# Patient Record
(demographics unavailable — no encounter records)

---

## 2024-12-30 NOTE — HISTORY OF PRESENT ILLNESS
[FreeTextEntry1] : 73 yo female here for annual PE. Pt had fall with fracture of pelvis and sacrum in 9/24- doing PT; doing better now.  c/o urinary leakage. Also c/o hearing loss.

## 2024-12-30 NOTE — HEALTH RISK ASSESSMENT
[Good] : ~his/her~  mood as  good [Never (0 pts)] : Never (0 points) [Any fall with injury in past year] : Patient reported fall with injury in the past year [PHQ-2 Negative - No further assessment needed] : PHQ-2 Negative - No further assessment needed [Time Spent: ___ Minutes] : I spent [unfilled] minutes performing a depression screening for this patient. [Never] : Never [MammogramDate] : few yrs [PapSmearDate] : few yrs [BoneDensityDate] : few yrs

## 2024-12-30 NOTE — PLAN
[FreeTextEntry1] : check bw cont meds advised healthy diet/exercise discussed vaccines- had flu vaccine in the fall.  Depression screen done & reviewed 5 minutes  HTN/ a fib- pt sees Cardiology Hearing loss- ref Audiology Bladder leakage- ref Urology Arthritis- f/u with Ortho Routine- ref DEXA and mammo

## 2025-02-25 NOTE — HISTORY OF PRESENT ILLNESS
[Denies] : Denies [No] : No [N/A] : N/A [Yes] : Yes [Left upper extremity] : Left upper extremity [24g] : 24g [Start Time: ___] : Medication Start Time: [unfilled] [End Time: ___] : Medication End Time: [unfilled] [Medication Name: ___] : Medication Name: [unfilled] [Total Amount Administered: ___] : Total Amount Administered: [unfilled] [IV discontinued. Intact. No signs or symptoms of IV complications noted. Time: ___] : IV discontinued. Intact. No signs or symptoms of IV complications noted. Time: [unfilled] [Patient  instructed to seek medical attention with signs and symptoms of adverse effects] : Patient  instructed to seek medical attention with signs and symptoms of adverse effects [Patient left unit in no acute distress] : Patient left unit in no acute distress [Medications administered as ordered and tolerated well.] : Medications administered as ordered and tolerated well. [de-identified] : left medial [de-identified] : Patient presents for Zoledronic Acid infusion. Patient denies of any complaints or symptoms today. Education and teaching materials provided to patient. Patient verbalized understanding. Patient tolerated infusion well.

## 2025-05-12 NOTE — PLAN
[FreeTextEntry1] : Osteoporosis- saw Endo, had Reclast. HTN- BP stable. cont amlodipine and lisinopril.  A fib- seeing Cardio. cont amioadarone. Considering ablation.   f/u in 2 mths and will redo bw then

## 2025-05-12 NOTE — HISTORY OF PRESENT ILLNESS
[FreeTextEntry1] : Pt here for follow up. Since last visit she saw Endo for osteoporosis and started on Reclast- had one infusion in February. Felt a little achy afterwards but just lasted a day and a half.  Also a fib has been coming back. Had cardioversion and now on amiodarone. Considering ablation.  Had bw 3/27/25 and creatinine was a little elevated.

## 2025-05-19 NOTE — HISTORY OF PRESENT ILLNESS
[FreeTextEntry1] : Patient was diagnosed via initial DEXA scan 2024 - LS -3.1, TH -3.7, FN -3.6. VFA shows compression fracture of L1. Recalls spinal fx s/p fall in 2023, which healed to secondary intention. Also had pelvis and sacrum fx s/p fall 9/2024. States she never followed up with Ortho but believes it is healing as she has no pain. Doing PT. High falls risk due to visual impairment (retinitis pigmentosa, dx age 50, no peripheral vision) and hearing impairment.  Past osteoporosis medications: denies  Pt was still menstruating until MICAH BSO for uterine/bladder prolapse at age 52. +HRT x1 year. No complications. History of breast cancer: denies. No chemo, RT ever.  Active lifestyle but no formal exercise. Fractures: as above Assistive device: denies Fhx: osteoporosis in mom, denies parental hip fracture.  Calcium: yogurt, cottage cheese, some salmon, and green leafy vegetables (spinach, lettuce). Supplements with Centrum Silver Women's 50+ QD. Vit D deficiency: level low, 18, in 12/2024. Supplements with 1000 IU QD. Denies history of kidney stones, excessive alcohol intake, excessive soda intake, celiac disease, malabsorption, nutritional deficiencies, GIB, stomach ulcers.  HTN CHF on spironolactone.  Afib on beta blocker and blood thinner. Denies MI, CVA, clots.  Denies active smoking. Denies Paget's Dz, hyperparathyroidism, diabetes and chronic steroid use.  Sees DDS every 6 months. No major dental work planned. Partial upper dentures.   05/19/2025 The patient elected to start Alendronate/Fosamax 2/2025 but then called back to get IV Reclast. First dose 2/25/25. +APR x1.5 days. Denies jaw pain. No ONJ, not planning any major dental work. Denies thigh pain. No interval falls or fracture. H/o afib, sees cardiology, had cardioversion 3/28/25, switched from sotalol to amiodarone. Considering ablation in the future. No other interval changes.

## 2025-05-19 NOTE — PHYSICAL EXAM
[Alert] : alert [No Acute Distress] : no acute distress [Normal Sclera/Conjunctiva] : normal sclera/conjunctiva [EOMI] : extra ocular movement intact [No Proptosis] : no proptosis [No Respiratory Distress] : no respiratory distress [No Accessory Muscle Use] : no accessory muscle use [Normal Rate] : heart rate was normal [No Edema] : no peripheral edema [Not Tender] : non-tender [Not Distended] : not distended [Soft] : abdomen soft [No Spinal Tenderness] : no spinal tenderness [Spine Straight] : spine straight [No Tremors] : no tremors [Oriented x3] : oriented to person, place, and time [de-identified] : no peripheral vision [de-identified] : hard of hearing, partial upper dentures [de-identified] : uses both hands to stand from seated position, slow gait

## 2025-05-19 NOTE — REASON FOR VISIT
[Follow - Up] : a follow-up visit [Osteoporosis] : osteoporosis [Spouse] : spouse [FreeTextEntry2] : Dr. Mckeon

## 2025-05-19 NOTE — PROCEDURE
[FreeTextEntry1] : Bone Mineral Density:01/14/2025 Indication: screening Spine: -3.1, osteoporosis Total hip: -3.7, osteoporosis Femoral neck: -3.6, osteoporosis Proximal radius: not measured Single lateral view of the thoracolumbar spine (T10-L4) demonstrates mild to moderate compression deformity of L1 vertebral body.

## 2025-05-19 NOTE — ASSESSMENT
[FreeTextEntry1] : Initial consultation for osteoporosis, 02/11/2025. Follow up.  Osteoporosis - Pt was still menstruating until MICAH BSO for uterine/bladder prolapse at age 52. +HRT x1 year - Initial DEXA 2024 - LS -3.1, TH -3.7, FN -3.6. VFA shows compression fracture of L1.  - Spinal fx s/p fall in 2023, which healed to secondary intention. - Pelvis and sacrum fx s/p fall 9/2024.  States she never followed up with Ortho but believes it is healing as she has no pain. Doing PT. - High falls risk due to visual impairment (retinitis pigmentosa, dx age 50, no peripheral vision) and hearing impairment. - Fh/o osteoporosis in mom - BMD results were discussed with the patient. Given the patient's history and BMD, the patient is at an increased risk of future fracture. Options of medical therapy were discussed in detail including risks and benefits. - The patient elected to start Alendronate/Fosamax 2/2025, but then called back to get IV Reclast. First dose 2/25/25, +APR x1.5 days. No ONJ or AFF. - Of note, pt with chronic afib managed by cardiology, had cardioversion 3/2025 and switched from sotalol to amiodarone, considering ablation. I discussed that pt can consider Alendronate next year.  I discussed that weight bearing exercises, cardiovascular activities, and diet are beneficial to overall health, but are not adequate for bone density increase or alternative to osteoporosis medication.  Vit D deficiency - Level low, 18, in 12/2024. Now supplements with 1000 IU QD. - Supplements with Centrum Silver Women's 50+ QD. - Repeat level on follow up.  Script given for labs to be done in July with PCP. Script given for repeat DEXA 2/2026. Follow up (virtual OK) 3/2026.   Thierry MS, Ba SL, Estefany KL, Roxanne EM, Vivek KG,  AJ, Bettina ES. The clinician's guide to prevention and treatment of osteoporosis. Osteoporosis Int. 2022 Oct;33(10):4397-8677.

## 2025-07-01 NOTE — HISTORY OF PRESENT ILLNESS
[FreeTextEntry1] : Pt here for follow up, for bloodwork. Pt has seen Cardiology and Endo and they both requested bloodwork.  Generally feeling well.

## 2025-07-01 NOTE — PLAN
[FreeTextEntry1] : A fib- cont amiodarone. check bw requested by Cardio.  Osteoporosis- check bw from Endo R breast asymmetry- check 6 mth repeat mammo  HTN- BP stable. cont Lisinopril and amlodipine.

## 2025-07-29 NOTE — PHYSICAL EXAM
[General Appearance - Alert] : alert [General Appearance - In No Acute Distress] : in no acute distress [Sclera] : the sclera and conjunctiva were normal [Outer Ear] : the ears and nose were normal in appearance [Neck Appearance] : the appearance of the neck was normal [Auscultation Breath Sounds / Voice Sounds] : lungs were clear to auscultation bilaterally [Heart Rate And Rhythm] : heart rate was normal and rhythm regular [Heart Sounds] : normal S1 and S2 [Murmurs] : no murmurs [Heart Sounds Pericardial Friction Rub] : no pericardial rub [Edema] : there was no peripheral edema [Bowel Sounds] : normal bowel sounds [Abdomen Soft] : soft [Involuntary Movements] : no involuntary movements were seen [] : no rash [No Focal Deficits] : no focal deficits [Oriented To Time, Place, And Person] : oriented to person, place, and time [Affect] : the affect was normal [Mood] : the mood was normal

## 2025-07-30 NOTE — HISTORY OF PRESENT ILLNESS
[FreeTextEntry1] : 72-year-old female with history of retinitis pigmentosa with vision loss, hypertension, A-fib here to establish renal care for JANEE. Patient had relatively normal renal function in December and February with a creatinine of 0.9-1.1. She has had several episodes of rapid A-fib requiring cardioversion x 2 at [facility] in March.  She eventually was placed on amiodarone.  Her rate control is improved. She also has osteoporosis and was given Reclast in February. Her creatinine in July was 1.59 and potassium was 5.5. Medications include lisinopril and spironolactone.  Her amiodarone is now 100 mg daily She denies NSAID use.

## 2025-07-30 NOTE — ASSESSMENT
[FreeTextEntry1] : 72-year-old female with history of hypertension, A-fib, JANEE. JANEE is likely hemodynamic mediated. Will repeat her renal function today. Will check urinalysis as well. Will have her perform a renal Doppler as well as a bladder sonogram. Unclear every class because some JANEE.  Will trend her renal function and would avoid Reclast for now. Hypertension: Blood pressure is elevated today in the office. Advised to check home BPs. She is to continue her lisinopril and half dose Aldactone as long as her potassium remains stable. A-fib: Appears in sinus today.  No objection to continued amiodarone use for now.  Patient is going to follow with EP if ablation is necessary. Recommend to hydrate at least 32 to 40 ounces a day. Avoid chronic NSAID use.  All questions were answered.  Follow-up pending blood work.  60 minutes have been spent reviewing medical history, reviewing patient's medical chart including prior lab results, imaging reports, interviewing and examining patient, formulating a detailed plan with patient, documentation and coordinating care with referring physician.

## 2025-07-30 NOTE — CONSULT LETTER
[Dear  ___] : Dear  [unfilled], [Consult Letter:] : I had the pleasure of evaluating your patient, [unfilled]. [( Thank you for referring [unfilled] for consultation for _____ )] : Thank you for referring [unfilled] for consultation for [unfilled] [Please see my note below.] : Please see my note below. [Consult Closing:] : Thank you very much for allowing me to participate in the care of this patient.  If you have any questions, please do not hesitate to contact me. [Sincerely,] : Sincerely, [FreeTextEntry3] : Reanna Lam MD  [facility] [employer] [facility] at [facility] Division of Nephrology and Hypertension